# Patient Record
Sex: FEMALE | Race: WHITE | Employment: UNEMPLOYED | URBAN - METROPOLITAN AREA
[De-identification: names, ages, dates, MRNs, and addresses within clinical notes are randomized per-mention and may not be internally consistent; named-entity substitution may affect disease eponyms.]

---

## 2018-03-09 ENCOUNTER — HOSPITAL ENCOUNTER (INPATIENT)
Age: 58
LOS: 1 days | Discharge: HOME | End: 2018-03-10
Attending: SPECIALIST | Admitting: INTERNAL MEDICINE
Payer: COMMERCIAL

## 2018-03-09 DIAGNOSIS — R09.02 HYPOXIA: ICD-10-CM

## 2018-03-09 DIAGNOSIS — F10.920 ALCOHOLIC INTOXICATION WITHOUT COMPLICATION (HCC): Primary | ICD-10-CM

## 2018-03-09 PROBLEM — F10.121 ALCOHOL INTOXICATION DELIRIUM (HCC): Status: ACTIVE | Noted: 2018-03-09

## 2018-03-09 LAB
ETHANOL PERCENT: 0.39 %
ETHANOL: 386 MG/DL

## 2018-03-09 PROCEDURE — 99284 EMERGENCY DEPT VISIT MOD MDM: CPT

## 2018-03-09 PROCEDURE — G0378 HOSPITAL OBSERVATION PER HR: HCPCS

## 2018-03-09 PROCEDURE — G0480 DRUG TEST DEF 1-7 CLASSES: HCPCS

## 2018-03-09 PROCEDURE — 2580000003 HC RX 258: Performed by: PHYSICIAN ASSISTANT

## 2018-03-09 PROCEDURE — 1200000000 HC SEMI PRIVATE

## 2018-03-09 PROCEDURE — 36415 COLL VENOUS BLD VENIPUNCTURE: CPT

## 2018-03-09 RX ORDER — 0.9 % SODIUM CHLORIDE 0.9 %
1000 INTRAVENOUS SOLUTION INTRAVENOUS ONCE
Status: COMPLETED | OUTPATIENT
Start: 2018-03-09 | End: 2018-03-09

## 2018-03-09 RX ORDER — GUAIFENESIN 400 MG/1
400 TABLET ORAL 4 TIMES DAILY PRN
Status: ON HOLD | COMMUNITY
End: 2018-03-10 | Stop reason: HOSPADM

## 2018-03-09 RX ORDER — SODIUM CHLORIDE 9 MG/ML
INJECTION, SOLUTION INTRAVENOUS CONTINUOUS
Status: DISCONTINUED | OUTPATIENT
Start: 2018-03-09 | End: 2018-03-10

## 2018-03-09 RX ADMIN — SODIUM CHLORIDE 75 ML/HR: 9 INJECTION, SOLUTION INTRAVENOUS at 19:45

## 2018-03-09 RX ADMIN — SODIUM CHLORIDE 1000 ML: 9 INJECTION, SOLUTION INTRAVENOUS at 18:45

## 2018-03-09 ASSESSMENT — PAIN DESCRIPTION - PAIN TYPE: TYPE: CHRONIC PAIN

## 2018-03-09 ASSESSMENT — PAIN DESCRIPTION - LOCATION: LOCATION: BACK

## 2018-03-09 ASSESSMENT — PAIN SCALES - GENERAL: PAINLEVEL_OUTOF10: 3

## 2018-03-09 NOTE — ED PROVIDER NOTES
chair when she slipped down between the chair and the wall. She did not sustain any head injury. No history of loss of consciousness. There are no external signs of injuries noted anywhere. Patient is obviously intoxicated but she is moving all 4 extremities freely. She denies any chest or abdominal pain and denies any shortness of breath. She is afebrile and vital signs appear to be stable. Serum alcohol level is ordered and patient will need to be reevaluated after she is sober. Serum alcohol level came back as 386. Patient was admitted for observation.        Bello Monique MD  03/10/18 7247

## 2018-03-09 NOTE — ED PROVIDER NOTES
[facility] ED  [address] [place] [address]  Phone: [phone]  Fax: [phone]      Pt Name: Avi Boxer  MRN: [id number]  Mary Lougfxiang 1960  Date of evaluation: 3/9/2018      CHIEF COMPLAINT       Chief Complaint   Patient presents with    Alcohol Intoxication       HISTORY OF PRESENT ILLNESS   (Location, Quality, Severity, Duration, Timing, Context, Modifying Factors, Associated Signs and Symptoms)     Avi Boxer is a 65 y.o. female who presents to the ER via EMS for evaluation. EMS was contacted by a family member. The patient has been drinking throughout the day. Patient has been drinking rum, as well as, beer. It was reported that the patient slipped out of her chair and was found sitting between the wall and her chair. Patient has no complaints of chest pain or difficulty breathing. Patient denies abdominal pain. She has complained of feeling slightly nauseous. She denies pain in the upper and lower extremities. She denies pain in her neck. She reports a history of back pain for which she takes Flexeril. Patient is unable to quantify her discomfort. Nursing Notes were reviewed. REVIEW OF SYSTEMS     (2-9 systems for level 4, 10 or more for level 5)    Review of Systems   Unable to perform ROS: Other   Alcohol intoxication    PAST MEDICAL HISTORY    has a past medical history of Alcohol abuse. SURGICAL HISTORY     Unknown. CURRENT MEDICATIONS       Previous Medications    GUAIFENESIN 400 MG TABLET    Take 400 mg by mouth 4 times daily as needed for Cough    RANITIDINE HCL (RANITIDINE ACID REDUCER PO)    Take by mouth    TIZANIDINE HCL PO    Take by mouth    VENLAFAXINE HCL (EFFEXOR PO)    Take by mouth     ALLERGIES     has No Known Allergies. FAMILY HISTORY     Not relevant to the current problem. SOCIAL HISTORY      reports that she has been smoking.   She has never used smokeless tobacco. She reports that she drinks about 1.8 oz of alcohol per week . She reports that she does not use drugs. PHYSICAL EXAM     (7 for level 4, 8 or more for level 5)    /73   Pulse 86   Temp 98.2 °F (36.8 °C) (Oral)   Resp 20   Wt 74.8 kg (165 lb)   SpO2 97%     Physical Exam   Constitutional: She appears well-developed and well-nourished. Nontoxic. HENT:   Head: Normocephalic and atraumatic. Mouth/Throat: Oropharynx is clear and moist.   Eyes: No scleral icterus. Neck: Normal range of motion. Neck supple. No cervical midline tenderness. Cardiovascular: Normal rate, regular rhythm and normal heart sounds. Pulmonary/Chest: Effort normal and breath sounds normal. She has no wheezes. Abdominal: Soft. Bowel sounds are normal. There is no tenderness. There is no rebound and no guarding. Musculoskeletal:   Moving all extremities. No bony tenderness in the upper or lower extremities. No thoracic or lumbar midline tenderness. Neurological: She is alert. Tongue protrudes to midline; symmetrical smile; normal strength and sensation in the upper and lower extremities. Skin: Skin is warm and dry. Vitals reviewed. DIAGNOSTIC RESULTS     LABS:  Results for orders placed or performed during the hospital encounter of 03/09/18   Ethanol   Result Value Ref Range    Ethanol 386 (HH) <10 mg/dL    Ethanol percent 0.386 %     MDM:   Patient presents to the ER via EMS under the influence of alcohol. Patient is arousable but nods off to sleep quickly. Per EMS, the patient slipped out of her chair and was found sitting on the floor between her chair and a wall. Patient shows no visual signs of injury. She has no complaints of pain. She states that she is feeling slightly nauseous. Oxygen saturation is low, so the patient be placed on 3 L of oxygen. I will obtain a blood alcohol level.     EMERGENCY DEPARTMENT COURSE:   Vitals:    Vitals:    03/09/18 1839   BP: 109/73   Pulse: 86   Resp: 20   Temp: 98.2 °F (36.8 °C)   TempSrc: Oral

## 2018-03-10 VITALS
TEMPERATURE: 98.6 F | HEIGHT: 68 IN | BODY MASS INDEX: 21.76 KG/M2 | RESPIRATION RATE: 17 BRPM | HEART RATE: 95 BPM | SYSTOLIC BLOOD PRESSURE: 113 MMHG | WEIGHT: 143.6 LBS | DIASTOLIC BLOOD PRESSURE: 67 MMHG | OXYGEN SATURATION: 93 %
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
## 2018-03-10 PROBLEM — F10.921 ALCOHOL INTOXICATION WITH DELIRIUM (HCC): Status: ACTIVE | Noted: 2018-03-09

## 2018-03-10 PROBLEM — F10.121 ALCOHOL INTOXICATION DELIRIUM (HCC): Status: RESOLVED | Noted: 2018-03-09 | Resolved: 2018-03-10

## 2018-03-10 PROBLEM — J41.0 SIMPLE CHRONIC BRONCHITIS (HCC): Status: ACTIVE | Noted: 2018-03-10

## 2018-03-10 LAB
ALBUMIN SERPL-MCNC: 3.5 G/DL (ref 3.5–5.2)
ALBUMIN/GLOBULIN RATIO: 1.8 (ref 1–2.5)
ALP BLD-CCNC: 96 U/L (ref 35–104)
ALT SERPL-CCNC: 8 U/L (ref 5–33)
ANION GAP SERPL CALCULATED.3IONS-SCNC: 14 MMOL/L (ref 9–17)
AST SERPL-CCNC: 13 U/L
BILIRUB SERPL-MCNC: 0.19 MG/DL (ref 0.3–1.2)
BUN BLDV-MCNC: 9 MG/DL (ref 6–20)
BUN/CREAT BLD: ABNORMAL (ref 9–20)
CALCIUM IONIZED: 1.1 MMOL/L (ref 1.13–1.33)
CALCIUM SERPL-MCNC: 8.1 MG/DL (ref 8.6–10.4)
CHLORIDE BLD-SCNC: 111 MMOL/L (ref 98–107)
CO2: 20 MMOL/L (ref 20–31)
CREAT SERPL-MCNC: 0.53 MG/DL (ref 0.5–0.9)
GFR AFRICAN AMERICAN: >60 ML/MIN
GFR NON-AFRICAN AMERICAN: >60 ML/MIN
GFR SERPL CREATININE-BSD FRML MDRD: ABNORMAL ML/MIN/{1.73_M2}
GFR SERPL CREATININE-BSD FRML MDRD: ABNORMAL ML/MIN/{1.73_M2}
GLUCOSE BLD-MCNC: 79 MG/DL (ref 70–99)
HCT VFR BLD CALC: 38.5 % (ref 36.3–47.1)
HEMOGLOBIN: 12.2 G/DL (ref 11.9–15.1)
INR BLD: 1
MAGNESIUM: 1.9 MG/DL (ref 1.6–2.6)
MCH RBC QN AUTO: 29.8 PG (ref 25.2–33.5)
MCHC RBC AUTO-ENTMCNC: 31.7 G/DL (ref 28.4–34.8)
MCV RBC AUTO: 94.1 FL (ref 82.6–102.9)
NRBC AUTOMATED: 0 PER 100 WBC
PDW BLD-RTO: 15.8 % (ref 11.8–14.4)
PHOSPHORUS: 3.4 MG/DL (ref 2.6–4.5)
PLATELET # BLD: 249 K/UL (ref 138–453)
PMV BLD AUTO: 10.1 FL (ref 8.1–13.5)
POTASSIUM SERPL-SCNC: 3.8 MMOL/L (ref 3.7–5.3)
PROTHROMBIN TIME: 11.1 SEC (ref 9–12)
RBC # BLD: 4.09 M/UL (ref 3.95–5.11)
SODIUM BLD-SCNC: 145 MMOL/L (ref 135–144)
TOTAL PROTEIN: 5.4 G/DL (ref 6.4–8.3)
TSH SERPL DL<=0.05 MIU/L-ACNC: 5.76 MIU/L (ref 0.3–5)
WBC # BLD: 5.4 K/UL (ref 3.5–11.3)

## 2018-03-10 PROCEDURE — 85610 PROTHROMBIN TIME: CPT

## 2018-03-10 PROCEDURE — 2580000003 HC RX 258: Performed by: NURSE PRACTITIONER

## 2018-03-10 PROCEDURE — 83036 HEMOGLOBIN GLYCOSYLATED A1C: CPT

## 2018-03-10 PROCEDURE — 84443 ASSAY THYROID STIM HORMONE: CPT

## 2018-03-10 PROCEDURE — 94762 N-INVAS EAR/PLS OXIMTRY CONT: CPT

## 2018-03-10 PROCEDURE — 84100 ASSAY OF PHOSPHORUS: CPT

## 2018-03-10 PROCEDURE — 2500000003 HC RX 250 WO HCPCS: Performed by: NURSE PRACTITIONER

## 2018-03-10 PROCEDURE — 82330 ASSAY OF CALCIUM: CPT

## 2018-03-10 PROCEDURE — 80053 COMPREHEN METABOLIC PANEL: CPT

## 2018-03-10 PROCEDURE — G0378 HOSPITAL OBSERVATION PER HR: HCPCS

## 2018-03-10 PROCEDURE — 85027 COMPLETE CBC AUTOMATED: CPT

## 2018-03-10 PROCEDURE — 83735 ASSAY OF MAGNESIUM: CPT

## 2018-03-10 PROCEDURE — 99222 1ST HOSP IP/OBS MODERATE 55: CPT | Performed by: INTERNAL MEDICINE

## 2018-03-10 PROCEDURE — 36415 COLL VENOUS BLD VENIPUNCTURE: CPT

## 2018-03-10 PROCEDURE — 6370000000 HC RX 637 (ALT 250 FOR IP): Performed by: NURSE PRACTITIONER

## 2018-03-10 PROCEDURE — 6360000002 HC RX W HCPCS: Performed by: NURSE PRACTITIONER

## 2018-03-10 RX ORDER — SODIUM CHLORIDE 0.9 % (FLUSH) 0.9 %
10 SYRINGE (ML) INJECTION PRN
Status: DISCONTINUED | OUTPATIENT
Start: 2018-03-10 | End: 2018-03-10 | Stop reason: HOSPADM

## 2018-03-10 RX ORDER — NICOTINE 21 MG/24HR
1 PATCH, TRANSDERMAL 24 HOURS TRANSDERMAL DAILY
Status: DISCONTINUED | OUTPATIENT
Start: 2018-03-10 | End: 2018-03-10 | Stop reason: HOSPADM

## 2018-03-10 RX ORDER — LORAZEPAM 1 MG/1
4 TABLET ORAL
Status: DISCONTINUED | OUTPATIENT
Start: 2018-03-10 | End: 2018-03-10 | Stop reason: HOSPADM

## 2018-03-10 RX ORDER — SODIUM CHLORIDE 9 MG/ML
INJECTION, SOLUTION INTRAVENOUS CONTINUOUS
Status: DISCONTINUED | OUTPATIENT
Start: 2018-03-10 | End: 2018-03-10 | Stop reason: HOSPADM

## 2018-03-10 RX ORDER — LORAZEPAM 2 MG/ML
1 INJECTION INTRAMUSCULAR
Status: DISCONTINUED | OUTPATIENT
Start: 2018-03-10 | End: 2018-03-10 | Stop reason: HOSPADM

## 2018-03-10 RX ORDER — POTASSIUM CHLORIDE 20 MEQ/1
40 TABLET, EXTENDED RELEASE ORAL PRN
Status: DISCONTINUED | OUTPATIENT
Start: 2018-03-10 | End: 2018-03-10 | Stop reason: HOSPADM

## 2018-03-10 RX ORDER — ACETAMINOPHEN 325 MG/1
650 TABLET ORAL EVERY 4 HOURS PRN
Status: DISCONTINUED | OUTPATIENT
Start: 2018-03-10 | End: 2018-03-10 | Stop reason: HOSPADM

## 2018-03-10 RX ORDER — POTASSIUM CHLORIDE 7.45 MG/ML
10 INJECTION INTRAVENOUS PRN
Status: DISCONTINUED | OUTPATIENT
Start: 2018-03-10 | End: 2018-03-10 | Stop reason: HOSPADM

## 2018-03-10 RX ORDER — LORAZEPAM 1 MG/1
3 TABLET ORAL
Status: DISCONTINUED | OUTPATIENT
Start: 2018-03-10 | End: 2018-03-10 | Stop reason: HOSPADM

## 2018-03-10 RX ORDER — LORAZEPAM 2 MG/ML
4 INJECTION INTRAMUSCULAR
Status: DISCONTINUED | OUTPATIENT
Start: 2018-03-10 | End: 2018-03-10 | Stop reason: HOSPADM

## 2018-03-10 RX ORDER — TRAMADOL HYDROCHLORIDE 50 MG/1
100 TABLET ORAL EVERY 6 HOURS PRN
Status: DISCONTINUED | OUTPATIENT
Start: 2018-03-10 | End: 2018-03-10 | Stop reason: HOSPADM

## 2018-03-10 RX ORDER — LORAZEPAM 2 MG/ML
3 INJECTION INTRAMUSCULAR
Status: DISCONTINUED | OUTPATIENT
Start: 2018-03-10 | End: 2018-03-10 | Stop reason: HOSPADM

## 2018-03-10 RX ORDER — POTASSIUM CHLORIDE 20MEQ/15ML
40 LIQUID (ML) ORAL PRN
Status: DISCONTINUED | OUTPATIENT
Start: 2018-03-10 | End: 2018-03-10 | Stop reason: HOSPADM

## 2018-03-10 RX ORDER — TRAMADOL HYDROCHLORIDE 50 MG/1
50 TABLET ORAL EVERY 6 HOURS PRN
Status: DISCONTINUED | OUTPATIENT
Start: 2018-03-10 | End: 2018-03-10 | Stop reason: HOSPADM

## 2018-03-10 RX ORDER — ONDANSETRON 2 MG/ML
4 INJECTION INTRAMUSCULAR; INTRAVENOUS EVERY 6 HOURS PRN
Status: DISCONTINUED | OUTPATIENT
Start: 2018-03-10 | End: 2018-03-10 | Stop reason: HOSPADM

## 2018-03-10 RX ORDER — LORAZEPAM 2 MG/ML
2 INJECTION INTRAMUSCULAR
Status: DISCONTINUED | OUTPATIENT
Start: 2018-03-10 | End: 2018-03-10 | Stop reason: HOSPADM

## 2018-03-10 RX ORDER — SODIUM CHLORIDE 0.9 % (FLUSH) 0.9 %
10 SYRINGE (ML) INJECTION EVERY 12 HOURS SCHEDULED
Status: DISCONTINUED | OUTPATIENT
Start: 2018-03-10 | End: 2018-03-10 | Stop reason: HOSPADM

## 2018-03-10 RX ORDER — LORAZEPAM 1 MG/1
1 TABLET ORAL
Status: DISCONTINUED | OUTPATIENT
Start: 2018-03-10 | End: 2018-03-10 | Stop reason: HOSPADM

## 2018-03-10 RX ORDER — LORAZEPAM 1 MG/1
2 TABLET ORAL
Status: DISCONTINUED | OUTPATIENT
Start: 2018-03-10 | End: 2018-03-10 | Stop reason: HOSPADM

## 2018-03-10 RX ADMIN — FOLIC ACID: 5 INJECTION, SOLUTION INTRAMUSCULAR; INTRAVENOUS; SUBCUTANEOUS at 08:39

## 2018-03-10 RX ADMIN — SODIUM CHLORIDE: 9 INJECTION, SOLUTION INTRAVENOUS at 01:20

## 2018-03-10 RX ADMIN — ACETAMINOPHEN 650 MG: 325 TABLET ORAL at 04:32

## 2018-03-10 RX ADMIN — ENOXAPARIN SODIUM 40 MG: 40 INJECTION SUBCUTANEOUS at 08:39

## 2018-03-10 ASSESSMENT — PAIN SCALES - GENERAL
PAINLEVEL_OUTOF10: 0
PAINLEVEL_OUTOF10: 0
PAINLEVEL_OUTOF10: 9
PAINLEVEL_OUTOF10: 0

## 2018-03-10 NOTE — CARE COORDINATION
[place] [facility] [place]  Written by: Delaney Ahmadi RN    Patient Name: Suraj Vogel  Attending Provider: No att. providers found  Admit Date: 3/9/2018  6:33 PM  MRN: [id number]  Account: [de-identified]                     : 1960  Discharge Date: 3/10/2018      Disposition: home    Delaney Ahmadi RN

## 2018-03-10 NOTE — ED NOTES
Patient has removed N.C.  SaO2 93-94% on R.A. Has been incontinent. All clothing removed and bagged. Patient washed with soap and water, gown and non-slip socks placed. [facility] transport here, patient assisted up to bedside commode, then ambulated a few steps with 2 assist to stretcher for transport. IV fluids stopped, INT left in right A.C.      Mouna Guy RN  03/10/18 5639

## 2018-03-10 NOTE — FLOWSHEET NOTE
Discharge instructions reviewed and given to patient. Information on Alcohol abuse given.   Verbalizes understanding  Discharge via wheelchair ome wit friend

## 2018-03-10 NOTE — DISCHARGE SUMMARY
[facility]    Discharge Summary     Patient ID: Antoinette Bowers  :  1960   MRN: [id number]     ACCOUNT:  [de-identified]   Patient's PCP: Raul Bautista  Admit Date: 3/9/2018   Discharge Date: 3/10/2018     Length of Stay: 1  Code Status:  Full Code  Admitting Physician: Kelsea Buck MD  Discharge Physician: Kelsea uBck MD     Active Discharge Diagnoses:     Primary Problem  <principal problem not specified>      [place] Problems    Diagnosis Date Noted    Simple chronic bronchitis ([facility] [place] [address] [J41.0] 03/10/2018    Fall [W19. XXXA] 03/10/2018    Alcohol intoxication with delirium [facility]) [F10.921] 2018       Admission Condition:  fair     Discharged Condition: fair    Hospital Stay:     Hospital Course:  Miss Dennis Ramírez is a nice 65year old lady with history of chronic alcohol and tobacco abuse. She was drinking beer and also rum yesterday at home, fell down from chair and sleepiness. Alcohol was noted to be very high, diagnosed as alcohol in toxication and was admitted overnight, discharged home after sober. Advised alcohol cessation and also tobacco cessation, patient did not seem interested at this time. Significant therapeutic interventions: None    Significant Diagnostic Studies:   No results found. Consultations:    Consults:     Final Specialist Recommendations/Findings:   IP CONSULT TO DIETITIAN  IP CONSULT TO SOCIAL WORK  IP CONSULT TO SPIRITUAL SERVICES      The patient was seen and examined on day of discharge and this discharge summary is in conjunction with any daily progress note from day of discharge.     Discharge plan:     Disposition: Home    Physician Follow Up:   [address] [place] [address]  [phone]    Requiring Further Evaluation/Follow Up POST HOSPITALIZATION/Incidental Findings: None    Diet: regular diet    Activity: As

## 2018-03-10 NOTE — H&P
[facility]    HISTORY AND PHYSICAL EXAMINATION            Date:   3/10/2018  Patient name:  Tanja Cantrell  Date of admission:  3/9/2018  6:33 PM  MRN:   [id number]  Account:  [de-identified]  YOB: 1960  PCP:    Poncho Koo  Room:   2014/2014-01  Code Status:    Full Code    Chief Complaint:     Chief Complaint   Patient presents with    Alcohol Intoxication     History Obtained From:     Patient, medical record    History of Present Illness:     Miss Belen Farah is a nice 65year old lady with history of chronic alcohol and tobacco abuse. She was drinking beer and also rum yesterday at home, fell down from chair and sleepiness. Alcohol was noted to be very high, diagnosed as alcohol in toxication and was admitted overnight, discharged home after sober. Advised alcohol cessation and also tobacco cessation, patient did not seem interested at this time. Past Medical History:     Past Medical History:   Diagnosis Date    Alcohol abuse     COPD (chronic obstructive pulmonary disease) ([facility] [address]     Depression     Thyroid disease         Past Surgical History:     History reviewed. No pertinent surgical history. Medications Prior to Admission:     Prior to Admission medications    Medication Sig Start Date End Date Taking? Authorizing Provider   TIZANIDINE HCL PO Take by mouth   Yes Historical Provider, MD   Venlafaxine HCl (EFFEXOR PO) Take by mouth   Yes Historical Provider, MD   RaNITidine HCl (RANITIDINE ACID REDUCER PO) Take by mouth   Yes Historical Provider, MD      Allergies:     Penicillins    Social History:     Tobacco:    reports that she has been smoking. She has never used smokeless tobacco.  Alcohol:      reports that she drinks about 1.8 oz of alcohol per week . Drug Use:  reports that she does not use drugs.     Family History:     Family History   Problem Relation Age of Onset    Heart Attack Father      Review of Systems: At my visit she is completely awake    CONSTITUTIONAL:  negative for fevers, chills, sweats, fatigue, weight loss  HEENT:  negative for vision, hearing changes, runny nose, throat pain  RESPIRATORY:  negative for shortness of breath, cough, congestion, wheezing. CARDIOVASCULAR:  negative for chest pain, palpitations. GASTROINTESTINAL:  negative for nausea, vomiting, diarrhea, constipation, change in bowel habits, abdominal pain   GENITOURINARY:  negative for difficulty of urination, burning with urination, frequency   INTEGUMENT:  negative for rash, skin lesions, easy bruising   HEMATOLOGIC/LYMPHATIC:  negative for swelling/edema   ALLERGIC/IMMUNOLOGIC:  negative for urticaria , itching  ENDOCRINE:  negative increase in drinking, increase in urination, hot or cold intolerance  MUSCULOSKELETAL:  negative joint pains, muscle aches, swelling of joints  NEUROLOGICAL:  negative for headaches, dizziness, lightheadedness, numbness, pain, tingling extremities  BEHAVIOR/PSYCH:  negative for depression, anxiety    Physical Exam:   BP 88/60   Pulse 80   Temp 97.8 °F (36.6 °C) (Oral)   Resp 16   Ht 5' 7.5\" (1.715 m)   Wt 143 lb 9.6 oz (65.1 kg)   SpO2 94%   BMI 22.16 kg/m²   Temp (24hrs), Av °F (36.7 °C), Min:97.8 °F (36.6 °C), Max:98.2 °F (36.8 °C)    No results for input(s): POCGLU in the last 72 hours. No intake or output data in the 24 hours ending 03/10/18 0933    General Appearance:  alert, well appearing, and in no acute distress  Mental status: oriented to person, place, and time with normal affect  Head:  normocephalic, atraumatic.   Eye: no icterus, redness, pupils equal and reactive, extraocular eye movements intact, conjunctiva clear  Ear: normal external ear, no discharge, hearing intact  Nose:  no drainage noted  Mouth: mucous membranes moist  Neck: supple, no carotid bruits, thyroid not palpable  Lungs: Bilateral equal air entry, clear to ausculation, no wheezing, rales or rhonchi, normal effort  Cardiovascular: normal rate, regular rhythm, no murmur, gallop, rub.   Abdomen: Soft, nontender, nondistended, normal bowel sounds, no hepatomegaly or splenomegaly  Neurologic: There are no new focal motor or sensory deficits, normal muscle tone and bulk, no abnormal sensation, normal speech, cranial nerves II through XII grossly intact  Skin: No gross lesions, rashes, bruising or bleeding on exposed skin area  Extremities:  peripheral pulses palpable, no pedal edema or calf pain with palpation  Psych: normal affect    Investigations:      Laboratory Testing:  Recent Results (from the past 24 hour(s))   Ethanol    Collection Time: 03/09/18  6:23 PM   Result Value Ref Range    Ethanol 386 (HH) <10 mg/dL    Ethanol percent 0.386 %   Comprehensive Metabolic Panel w/ Reflex to MG    Collection Time: 03/10/18  7:01 AM   Result Value Ref Range    Glucose 79 70 - 99 mg/dL    BUN 9 6 - 20 mg/dL    CREATININE 0.53 0.50 - 0.90 mg/dL    Bun/Cre Ratio NOT REPORTED 9 - 20    Calcium 8.1 (L) 8.6 - 10.4 mg/dL    Sodium 145 (H) 135 - 144 mmol/L    Potassium 3.8 3.7 - 5.3 mmol/L    Chloride 111 (H) 98 - 107 mmol/L    CO2 20 20 - 31 mmol/L    Anion Gap 14 9 - 17 mmol/L    Alkaline Phosphatase 96 35 - 104 U/L    ALT 8 5 - 33 U/L    AST 13 <32 U/L    Total Bilirubin 0.19 (L) 0.3 - 1.2 mg/dL    Total Protein 5.4 (L) 6.4 - 8.3 g/dL    Alb 3.5 3.5 - 5.2 g/dL    Albumin/Globulin Ratio 1.8 1.0 - 2.5    GFR Non-African American >60 >60 mL/min    GFR African American >60 >60 mL/min    GFR Comment          GFR Staging NOT REPORTED    Magnesium    Collection Time: 03/10/18  7:01 AM   Result Value Ref Range    Magnesium 1.9 1.6 - 2.6 mg/dL   Calcium, Ionized    Collection Time: 03/10/18  7:01 AM   Result Value Ref Range    Calcium, Ion 1.10 (L) 1.13 - 1.33 mmol/L   CBC    Collection Time: 03/10/18  7:01 AM   Result Value Ref Range    WBC 5.4 3.5 - 11.3 k/uL    RBC 4.09 3.95 - 5.11 m/uL    Hemoglobin 12.2 11.9 - 15.1 g/dL

## 2018-03-10 NOTE — ED NOTES
[facility] contacted and hospitalist paged per Davina Cisse request     Yolis Dolan RN  03/09/18 6905

## 2018-03-11 LAB
ESTIMATED AVERAGE GLUCOSE: 108 MG/DL
HBA1C MFR BLD: 5.4 % (ref 4–6)
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
